# Patient Record
Sex: MALE
[De-identification: names, ages, dates, MRNs, and addresses within clinical notes are randomized per-mention and may not be internally consistent; named-entity substitution may affect disease eponyms.]

---

## 2018-03-15 ENCOUNTER — HOSPITAL ENCOUNTER (OUTPATIENT)
Dept: HOSPITAL 88 - MRI | Age: 68
End: 2018-03-15
Attending: SPECIALIST
Payer: MEDICARE

## 2018-03-15 DIAGNOSIS — M23.91: Primary | ICD-10-CM

## 2018-03-15 NOTE — DIAGNOSTIC IMAGING REPORT
TECHNIQUE:

Magnetic resonance imaging of the RIGHT KNEE was performed WITHOUT injected

contrast. 



HISTORY:  Knee pain

COMPARISON:  None available.



FINDINGS: 

LIGAMENTS AND TENDONS:

     ACL:  Intact

     PCL:  Intact

     Collateral ligaments:  Intact

     Iliotibial band:  Unremarkable

     Popliteal tendon:  Intact

     Extensor mechanism:  Intact



JOINT:

     Menisci: 

          Medial:  Near complete radial tear posterior horn root junction with

meniscal extrusion

          Lateral:  Small free margin horizontal tear to the body.



     Articular Cartilage:

          Medial Compartment:  Partial thickness cartilage loss

          Lateral Compartment:  Partial thickness cartilage loss

          Patellofemoral Compartment:  Partial thickness cartilage loss



     Joint Fluid: Moderate joint effusion.



BONE:  

No focal or infiltrative bone marrow replacing abnormality.  

No acute fracture.



SOFT TISSUES:

Otherwise, unremarkable.





IMPRESSION: 



Medial meniscus posterior horn root junction near complete radial tear results

in meniscal extrusion and partial thickness cartilage loss



Signed by: Dr. Andrew Palisch, M.D. on 3/15/2018 10:17 AM